# Patient Record
Sex: MALE | Race: WHITE | Employment: PART TIME | URBAN - METROPOLITAN AREA
[De-identification: names, ages, dates, MRNs, and addresses within clinical notes are randomized per-mention and may not be internally consistent; named-entity substitution may affect disease eponyms.]

---

## 2018-01-12 ENCOUNTER — HOSPITAL ENCOUNTER (EMERGENCY)
Age: 18
Discharge: LEFT BEFORE DISCHARGE | End: 2018-01-12
Attending: EMERGENCY MEDICINE
Payer: COMMERCIAL

## 2018-01-12 ENCOUNTER — APPOINTMENT (OUTPATIENT)
Dept: GENERAL RADIOLOGY | Age: 18
End: 2018-01-12
Attending: EMERGENCY MEDICINE
Payer: COMMERCIAL

## 2018-01-12 VITALS
OXYGEN SATURATION: 99 % | HEART RATE: 89 BPM | SYSTOLIC BLOOD PRESSURE: 135 MMHG | WEIGHT: 125 LBS | HEIGHT: 70 IN | BODY MASS INDEX: 17.9 KG/M2 | RESPIRATION RATE: 16 BRPM | DIASTOLIC BLOOD PRESSURE: 81 MMHG | TEMPERATURE: 98.5 F

## 2018-01-12 PROCEDURE — 73130 X-RAY EXAM OF HAND: CPT

## 2018-01-12 PROCEDURE — 99281 EMR DPT VST MAYX REQ PHY/QHP: CPT

## 2018-01-12 PROCEDURE — 73660 X-RAY EXAM OF TOE(S): CPT
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
## 2018-01-13 NOTE — ED NOTES
Patient not found in room. Patient not in x-ray. Unable to locate patient  In the waiting room either.

## 2020-02-06 ENCOUNTER — HOSPITAL ENCOUNTER (EMERGENCY)
Age: 20
Discharge: HOME | End: 2020-02-06
Attending: EMERGENCY MEDICINE | Admitting: EMERGENCY MEDICINE
Payer: COMMERCIAL

## 2020-02-06 VITALS
HEIGHT: 70 IN | OXYGEN SATURATION: 99 % | DIASTOLIC BLOOD PRESSURE: 78 MMHG | BODY MASS INDEX: 20.33 KG/M2 | RESPIRATION RATE: 16 BRPM | SYSTOLIC BLOOD PRESSURE: 140 MMHG | HEART RATE: 89 BPM | TEMPERATURE: 98.8 F | WEIGHT: 142 LBS

## 2020-02-06 DIAGNOSIS — N34.2 INFECTIVE URETHRITIS: ICD-10-CM

## 2020-02-06 DIAGNOSIS — Z20.2 STD EXPOSURE: Primary | ICD-10-CM

## 2020-02-06 DIAGNOSIS — R80.9 PROTEINURIA, UNSPECIFIED TYPE: ICD-10-CM

## 2020-02-06 LAB
ANION GAP SERPL CALC-SCNC: 6 MMOL/L (ref 3–18)
APPEARANCE UR: ABNORMAL
BACTERIA URNS QL MICRO: ABNORMAL /HPF
BASOPHILS # BLD: 0 K/UL (ref 0–0.1)
BASOPHILS NFR BLD: 0 % (ref 0–2)
BILIRUB UR QL: NEGATIVE
BUN SERPL-MCNC: 13 MG/DL (ref 7–18)
BUN/CREAT SERPL: 15 (ref 12–20)
CALCIUM SERPL-MCNC: 9.3 MG/DL (ref 8.5–10.1)
CHLORIDE SERPL-SCNC: 105 MMOL/L (ref 100–111)
CK SERPL-CCNC: 178 U/L (ref 39–308)
CO2 SERPL-SCNC: 26 MMOL/L (ref 21–32)
COLOR UR: ABNORMAL
CREAT SERPL-MCNC: 0.84 MG/DL (ref 0.6–1.3)
DIFFERENTIAL METHOD BLD: ABNORMAL
EOSINOPHIL # BLD: 0.1 K/UL (ref 0–0.4)
EOSINOPHIL NFR BLD: 0 % (ref 0–5)
EPITH CASTS URNS QL MICRO: ABNORMAL /LPF (ref 0–5)
ERYTHROCYTE [DISTWIDTH] IN BLOOD BY AUTOMATED COUNT: 13.2 % (ref 11.6–14.5)
GLUCOSE SERPL-MCNC: 110 MG/DL (ref 74–99)
GLUCOSE UR STRIP.AUTO-MCNC: NEGATIVE MG/DL
HCT VFR BLD AUTO: 41.1 % (ref 36–48)
HGB BLD-MCNC: 13.5 G/DL (ref 13–16)
HGB UR QL STRIP: ABNORMAL
KETONES UR QL STRIP.AUTO: NEGATIVE MG/DL
LEUKOCYTE ESTERASE UR QL STRIP.AUTO: ABNORMAL
LYMPHOCYTES # BLD: 2.2 K/UL (ref 0.9–3.6)
LYMPHOCYTES NFR BLD: 14 % (ref 21–52)
MCH RBC QN AUTO: 29.7 PG (ref 24–34)
MCHC RBC AUTO-ENTMCNC: 32.8 G/DL (ref 31–37)
MCV RBC AUTO: 90.5 FL (ref 74–97)
MONOCYTES # BLD: 0.4 K/UL (ref 0.05–1.2)
MONOCYTES NFR BLD: 3 % (ref 3–10)
NEUTS SEG # BLD: 13.5 K/UL (ref 1.8–8)
NEUTS SEG NFR BLD: 83 % (ref 40–73)
NITRITE UR QL STRIP.AUTO: NEGATIVE
PH UR STRIP: 6 [PH] (ref 5–8)
PLATELET # BLD AUTO: 236 K/UL (ref 135–420)
PMV BLD AUTO: 11.2 FL (ref 9.2–11.8)
POTASSIUM SERPL-SCNC: 3.7 MMOL/L (ref 3.5–5.5)
PROT UR STRIP-MCNC: >300 MG/DL
RBC # BLD AUTO: 4.54 M/UL (ref 4.7–5.5)
RBC #/AREA URNS HPF: ABNORMAL /HPF (ref 0–5)
SODIUM SERPL-SCNC: 137 MMOL/L (ref 136–145)
SP GR UR REFRACTOMETRY: >1.03 (ref 1–1.03)
UROBILINOGEN UR QL STRIP.AUTO: 0.2 EU/DL (ref 0.2–1)
WBC # BLD AUTO: 16.3 K/UL (ref 4.6–13.2)
WBC URNS QL MICRO: >100 /HPF (ref 0–4)

## 2020-02-06 PROCEDURE — 82550 ASSAY OF CK (CPK): CPT

## 2020-02-06 PROCEDURE — 80048 BASIC METABOLIC PNL TOTAL CA: CPT

## 2020-02-06 PROCEDURE — 87086 URINE CULTURE/COLONY COUNT: CPT

## 2020-02-06 PROCEDURE — 99283 EMERGENCY DEPT VISIT LOW MDM: CPT

## 2020-02-06 PROCEDURE — 87491 CHLMYD TRACH DNA AMP PROBE: CPT

## 2020-02-06 PROCEDURE — 85025 COMPLETE CBC W/AUTO DIFF WBC: CPT

## 2020-02-06 PROCEDURE — 74011250637 HC RX REV CODE- 250/637: Performed by: PHYSICIAN ASSISTANT

## 2020-02-06 PROCEDURE — 81001 URINALYSIS AUTO W/SCOPE: CPT

## 2020-02-06 RX ORDER — CEPHALEXIN 500 MG/1
1000 CAPSULE ORAL 2 TIMES DAILY
Qty: 28 CAP | Refills: 0 | Status: SHIPPED | OUTPATIENT
Start: 2020-02-06 | End: 2020-02-13

## 2020-02-06 RX ORDER — CEPHALEXIN 250 MG/1
1000 CAPSULE ORAL
Status: COMPLETED | OUTPATIENT
Start: 2020-02-06 | End: 2020-02-06

## 2020-02-06 RX ADMIN — CEPHALEXIN 1000 MG: 250 CAPSULE ORAL at 22:43

## 2020-02-07 LAB
C TRACH RRNA SPEC QL NAA+PROBE: NEGATIVE
N GONORRHOEA RRNA SPEC QL NAA+PROBE: NEGATIVE
SPECIMEN SOURCE: NORMAL

## 2020-02-07 NOTE — ED NOTES
Allergies verified, pt medicated per MAR, tolerated medication well, no adverse reactions noted, will continue to monitor.

## 2020-02-07 NOTE — ED PROVIDER NOTES
EMERGENCY DEPARTMENT HISTORY AND PHYSICAL EXAM    Date: 2/6/2020  Patient Name: Scooter Mallory. History of Presenting Illness     Chief Complaint   Patient presents with    Blood in Urine         History Provided By: Patient    Chief Complaint: hematuria  Duration: 1 Days  Timing:  Acute  Location:   Quality: Burning  Severity: Moderate  Modifying Factors: none  Associated Symptoms: penile discharge x several days      HPI: Scooter Mota is a 22 y.o. male with a PMH of No significant past medical history who presents to the ER c/o hematuria and penile discharge. Pt states his discharge began a few days ago and after having recent unprotected sexual intercourse. He was seen and treated at urgent care this afternoon and given IM Rocephin and Azithromycin for possible G/C. Pt states this afternoon, he developed gross hematuria. He denied any fevers, chills, sob, chest pain, abd pain, N/V, flank pain, back pain, pain or swelling in his penis/testicles and has no other symptoms or complaints. PCP: None        Past History     Past Medical History:  No past medical history on file. Past Surgical History:  No past surgical history on file. Family History:  No family history on file. Social History:  Social History     Tobacco Use    Smoking status: Current Every Day Smoker     Packs/day: 0.50    Smokeless tobacco: Never Used   Substance Use Topics    Alcohol use: Yes    Drug use: Not Currently       Allergies: Allergies   Allergen Reactions    Pcn [Penicillins] Unknown (comments)         Review of Systems   Review of Systems   Constitutional: Negative for chills, fatigue and fever. HENT: Negative. Negative for sore throat. Eyes: Negative. Respiratory: Negative for cough and shortness of breath. Cardiovascular: Negative for chest pain and palpitations. Gastrointestinal: Negative for abdominal pain, nausea and vomiting. Genitourinary: Positive for discharge and hematuria.  Negative for dysuria and flank pain. Musculoskeletal: Negative. Skin: Negative. Neurological: Negative for dizziness, weakness, light-headedness and headaches. Psychiatric/Behavioral: Negative. All other systems reviewed and are negative. Physical Exam     Vitals:    02/06/20 2044   BP: 163/90   Pulse: 100   Resp: 16   Temp: 99.9 °F (37.7 °C)   SpO2: 100%   Weight: 64.4 kg (142 lb)   Height: 5' 10\" (1.778 m)     Physical Exam  Vitals signs and nursing note reviewed. Constitutional:       General: He is not in acute distress. Appearance: Normal appearance. He is well-developed. He is not ill-appearing. HENT:      Head: Normocephalic and atraumatic. Mouth/Throat:      Mouth: Mucous membranes are moist.   Eyes:      General: No scleral icterus. Conjunctiva/sclera: Conjunctivae normal.   Neck:      Musculoskeletal: Normal range of motion and neck supple. Vascular: No JVD. Trachea: No tracheal deviation. Cardiovascular:      Rate and Rhythm: Normal rate and regular rhythm. Heart sounds: Normal heart sounds. No murmur. Pulmonary:      Effort: Pulmonary effort is normal. No respiratory distress. Breath sounds: Normal breath sounds. No stridor. No wheezing or rhonchi. Chest:      Chest wall: No tenderness. Abdominal:      General: Bowel sounds are normal. There is no distension. Palpations: Abdomen is soft. Tenderness: There is no abdominal tenderness. There is no right CVA tenderness, left CVA tenderness, guarding or rebound. Musculoskeletal:         General: No swelling or tenderness. Skin:     General: Skin is warm and dry. Capillary Refill: Capillary refill takes less than 2 seconds. Neurological:      Mental Status: He is alert and oriented to person, place, and time. GCS: GCS eye subscore is 4. GCS verbal subscore is 5. GCS motor subscore is 6.       Gait: Gait normal.           Diagnostic Study Results     Labs -     Recent Results (from the past 12 hour(s))   URINALYSIS W/ RFLX MICROSCOPIC    Collection Time: 02/06/20  8:48 PM   Result Value Ref Range    Color RED      Appearance BLOODY      Specific gravity >1.030 (H) 1.003 - 1.030    pH (UA) 6.0 5.0 - 8.0      Protein >300 (A) NEG mg/dL    Glucose NEGATIVE  NEG mg/dL    Ketone NEGATIVE  NEG mg/dL    Bilirubin NEGATIVE  NEG      Blood LARGE (A) NEG      Urobilinogen 0.2 0.2 - 1.0 EU/dL    Nitrites NEGATIVE  NEG      Leukocyte Esterase MODERATE (A) NEG     URINE MICROSCOPIC ONLY    Collection Time: 02/06/20  8:48 PM   Result Value Ref Range    WBC >100 (H) 0 - 4 /hpf    RBC TOO NUMEROUS TO COUNT 0 - 5 /hpf    Epithelial cells FEW 0 - 5 /lpf    Bacteria FEW (A) NEG /hpf   CBC WITH AUTOMATED DIFF    Collection Time: 02/06/20  9:35 PM   Result Value Ref Range    WBC 16.3 (H) 4.6 - 13.2 K/uL    RBC 4.54 (L) 4.70 - 5.50 M/uL    HGB 13.5 13.0 - 16.0 g/dL    HCT 41.1 36.0 - 48.0 %    MCV 90.5 74.0 - 97.0 FL    MCH 29.7 24.0 - 34.0 PG    MCHC 32.8 31.0 - 37.0 g/dL    RDW 13.2 11.6 - 14.5 %    PLATELET 956 736 - 633 K/uL    MPV 11.2 9.2 - 11.8 FL    NEUTROPHILS 83 (H) 40 - 73 %    LYMPHOCYTES 14 (L) 21 - 52 %    MONOCYTES 3 3 - 10 %    EOSINOPHILS 0 0 - 5 %    BASOPHILS 0 0 - 2 %    ABS. NEUTROPHILS 13.5 (H) 1.8 - 8.0 K/UL    ABS. LYMPHOCYTES 2.2 0.9 - 3.6 K/UL    ABS. MONOCYTES 0.4 0.05 - 1.2 K/UL    ABS. EOSINOPHILS 0.1 0.0 - 0.4 K/UL    ABS.  BASOPHILS 0.0 0.0 - 0.1 K/UL    DF AUTOMATED     METABOLIC PANEL, BASIC    Collection Time: 02/06/20  9:35 PM   Result Value Ref Range    Sodium 137 136 - 145 mmol/L    Potassium 3.7 3.5 - 5.5 mmol/L    Chloride 105 100 - 111 mmol/L    CO2 26 21 - 32 mmol/L    Anion gap 6 3.0 - 18 mmol/L    Glucose 110 (H) 74 - 99 mg/dL    BUN 13 7.0 - 18 MG/DL    Creatinine 0.84 0.6 - 1.3 MG/DL    BUN/Creatinine ratio 15 12 - 20      GFR est AA >60 >60 ml/min/1.73m2    GFR est non-AA >60 >60 ml/min/1.73m2    Calcium 9.3 8.5 - 10.1 MG/DL   CK    Collection Time: 02/06/20  9:35 PM Result Value Ref Range     39 - 308 U/L       Radiologic Studies -   No orders to display     CT Results  (Last 48 hours)    None        CXR Results  (Last 48 hours)    None            Medical Decision Making   I am the first provider for this patient. I reviewed the vital signs, available nursing notes, past medical history, past surgical history, family history and social history. Vital Signs-Reviewed the patient's vital signs. Records Reviewed: Nursing Notes and Old Medical Records     9:45 PM  23 y/o male who presents to the ER c/o hematuria and penile discharge. Pt was seen at urgent earlier today and tx for STD exposure. Was given IM Rocephin and Azithromycin 1000 mg for G/C treatment. Pt came to ER w/ he began urinating gross blood. No pain to abd/back on exam.  No other symptoms or complaints. UA w/ >100 wbc and rbc. Will plan on labs; urine culture added on. Pt otherwise non-toxic in appearance. Griffin Crenshaw PA-C     10:28 PM  Labs reviewed and noted to be stable other than having a mildly elevated white count. We will send off urine culture and treat with Keflex for UTI. Patient already empirically treated for possible gonorrhea and chlamydia. Discussed strict return precautions. Due to patient's elevated proteinuria we will also give nephrology follow-up. Patient stable for discharge. All questions answered and patient in agreement with plan of care. Will plan for discharge. Griffin Crenshaw PA-C    Disposition:  discharged    DISCHARGE NOTE:       Care plan outlined and precautions discussed. Patient has no new complaints, changes, or physical findings. Results of labs were reviewed with the patient. All medications were reviewed with the patient; will d/c home with keflex. All of pt's questions and concerns were addressed. Patient was instructed and agrees to follow up with nephrology, as well as to return to the ED upon further deterioration.  Patient is ready to go home.    Follow-up Information     Follow up With Specialties Details Why [facility] EMERGENCY DEPT Emergency Medicine  If symptoms worsen [address]  [phone]    [facility] [address] Schedule an appointment as soon as possible for a visit in 1 week ER follow up w/ nephrology for protein in urine as we discussed [address], [place]. [address]  [phone]          Current Discharge Medication List      START taking these medications    Details   cephALEXin (KEFLEX) 500 mg capsule Take 2 Caps by mouth two (2) times a day for 7 days. Qty: 28 Cap, Refills: 0             Provider Notes (Medical Decision Making):     Procedures:  Procedures        Diagnosis     Clinical Impression:   1. STD exposure    2. Infective urethritis    3.  Proteinuria, unspecified type

## 2020-02-07 NOTE — ED TRIAGE NOTES
Patient states he was seen at urgent care earlier today and treated for STI's due to penile discharge. Patient states a few hours ago he \"started peeing nothing but blood\". Patient states burning with urination but no other pain or complaints.

## 2020-02-07 NOTE — DISCHARGE INSTRUCTIONS
Patient Education        Proteinuria: Care Instructions  Your Care Instructions    Proteinuria means that you have too much protein in your urine. This is usually caused by a kidney problem. Your body's blood passes through your kidneys. Normally, the kidneys remove waste from the blood. The waste then leaves the body in the urine. But they don't let protein leave the body. If your kidneys are not working well, they let too much protein get in your urine. A high level of protein in your urine is a sign that something is harming your kidneys. It may be puzzling to find out that you have a problem with your kidneys, because you probably don't feel different. Your doctor may do more tests to find out what is causing the protein to get into your urine. Possible causes include an infection or a medical condition, such as diabetes or heart disease. You may need regular urine tests in the future. You may be able to reduce the protein in your urine by getting exercise, eating a healthy diet, and taking medicine. Follow-up care is a key part of your treatment and safety. Be sure to make and go to all appointments, and call your doctor if you are having problems. It's also a good idea to know your test results and keep a list of the medicines you take. How can you care for yourself at home? · Take your medicines exactly as prescribed. Call your doctor if you think you are having a problem with your medicine. You will get more details on the specific medicines your doctor prescribes. · Work with your doctor and dietitian to set up a diet that will be healthy for you. You may need to:  ? Eat a heart-healthy diet to keep the fat (cholesterol) in your blood under control. ? Eat a low-salt diet to keep your blood pressure at normal levels. ? Limit protein in your foods. ? Limit the amount of fluids you drink. · If you have diabetes, try to keep your blood sugar at normal or near-normal levels. ? Follow your diet.  Eat a variety of foods. Spread carbohydrate throughout your meals. ? If your doctor recommends it, get more exercise. Walking is a good choice. Bit by bit, increase the amount you walk every day. Try for at least 30 minutes on most days of the week. ? Check your blood sugar as often as your doctor recommends. · Do not smoke. Smoking raises your risk of many health problems, including kidney damage. If you need help quitting, talk to your doctor about stop-smoking programs and medicines. These can increase your chances of quitting for good. · Do not take ibuprofen, naproxen, acetaminophen (Tylenol), or similar medicines, unless your doctor tells you to. These medicines may make kidney problems worse. · Check with your doctor before you take any herbal supplements or over-the-counter medicines. When should you call for help? Watch closely for changes in your health, and be sure to contact your doctor if:    · You do not get better as expected. Where can you learn more? Go to http://ange-courtney.info/. Enter B464 in the search box to learn more about \"Proteinuria: Care Instructions. \"  Current as of: October 31, 2018  Content Version: 12.2  © 6626-1576 [employer]. Care instructions adapted under license by [employer] (which disclaims liability or warranty for this information). If you have questions about a medical condition or this instruction, always ask your healthcare professional. [address] any warranty or liability for your use of this information.

## 2020-02-08 LAB
BACTERIA SPEC CULT: NORMAL
SERVICE CMNT-IMP: NORMAL

## 2020-02-22 ENCOUNTER — HOSPITAL ENCOUNTER (EMERGENCY)
Age: 20
Discharge: HOME | End: 2020-02-23
Attending: EMERGENCY MEDICINE
Payer: COMMERCIAL

## 2020-02-22 VITALS
HEART RATE: 85 BPM | RESPIRATION RATE: 20 BRPM | DIASTOLIC BLOOD PRESSURE: 59 MMHG | HEIGHT: 70 IN | WEIGHT: 145 LBS | SYSTOLIC BLOOD PRESSURE: 128 MMHG | OXYGEN SATURATION: 98 % | TEMPERATURE: 97.8 F | BODY MASS INDEX: 20.76 KG/M2

## 2020-02-22 DIAGNOSIS — R45.851 SUICIDAL IDEATIONS: ICD-10-CM

## 2020-02-22 DIAGNOSIS — F11.20 HEROIN ADDICTION (HCC): ICD-10-CM

## 2020-02-22 DIAGNOSIS — F10.920 ALCOHOLIC INTOXICATION WITHOUT COMPLICATION (HCC): Primary | ICD-10-CM

## 2020-02-22 LAB
ALBUMIN SERPL-MCNC: 4.7 G/DL (ref 3.4–5)
ALBUMIN/GLOB SERPL: 1.2 {RATIO} (ref 0.8–1.7)
ALP SERPL-CCNC: 95 U/L (ref 45–117)
ALT SERPL-CCNC: 29 U/L (ref 16–61)
AMPHET UR QL SCN: NEGATIVE
ANION GAP SERPL CALC-SCNC: 8 MMOL/L (ref 3–18)
AST SERPL-CCNC: 19 U/L (ref 10–38)
BARBITURATES UR QL SCN: NEGATIVE
BASOPHILS # BLD: 0 K/UL (ref 0–0.1)
BASOPHILS NFR BLD: 0 % (ref 0–2)
BENZODIAZ UR QL: NEGATIVE
BILIRUB SERPL-MCNC: 0.4 MG/DL (ref 0.2–1)
BUN SERPL-MCNC: 6 MG/DL (ref 7–18)
BUN/CREAT SERPL: 8 (ref 12–20)
CALCIUM SERPL-MCNC: 8.6 MG/DL (ref 8.5–10.1)
CANNABINOIDS UR QL SCN: NEGATIVE
CHLORIDE SERPL-SCNC: 108 MMOL/L (ref 100–111)
CO2 SERPL-SCNC: 25 MMOL/L (ref 21–32)
COCAINE UR QL SCN: POSITIVE
CREAT SERPL-MCNC: 0.77 MG/DL (ref 0.6–1.3)
DIFFERENTIAL METHOD BLD: NORMAL
EOSINOPHIL # BLD: 0 K/UL (ref 0–0.4)
EOSINOPHIL NFR BLD: 0 % (ref 0–5)
ERYTHROCYTE [DISTWIDTH] IN BLOOD BY AUTOMATED COUNT: 13.2 % (ref 11.6–14.5)
ETHANOL SERPL-MCNC: 281 MG/DL (ref 0–3)
GLOBULIN SER CALC-MCNC: 3.8 G/DL (ref 2–4)
GLUCOSE SERPL-MCNC: 122 MG/DL (ref 74–99)
HCT VFR BLD AUTO: 44.3 % (ref 36–48)
HDSCOM,HDSCOM: ABNORMAL
HGB BLD-MCNC: 15.1 G/DL (ref 13–16)
LYMPHOCYTES # BLD: 2.1 K/UL (ref 0.9–3.6)
LYMPHOCYTES NFR BLD: 23 % (ref 21–52)
MCH RBC QN AUTO: 30 PG (ref 24–34)
MCHC RBC AUTO-ENTMCNC: 34.1 G/DL (ref 31–37)
MCV RBC AUTO: 87.9 FL (ref 74–97)
METHADONE UR QL: NEGATIVE
MONOCYTES # BLD: 0.5 K/UL (ref 0.05–1.2)
MONOCYTES NFR BLD: 5 % (ref 3–10)
NEUTS SEG # BLD: 6.5 K/UL (ref 1.8–8)
NEUTS SEG NFR BLD: 72 % (ref 40–73)
OPIATES UR QL: NEGATIVE
PCP UR QL: NEGATIVE
PLATELET # BLD AUTO: 334 K/UL (ref 135–420)
PMV BLD AUTO: 10.3 FL (ref 9.2–11.8)
POTASSIUM SERPL-SCNC: 3.6 MMOL/L (ref 3.5–5.5)
PROT SERPL-MCNC: 8.5 G/DL (ref 6.4–8.2)
RBC # BLD AUTO: 5.04 M/UL (ref 4.7–5.5)
SODIUM SERPL-SCNC: 141 MMOL/L (ref 136–145)
WBC # BLD AUTO: 9.1 K/UL (ref 4.6–13.2)

## 2020-02-22 PROCEDURE — 80307 DRUG TEST PRSMV CHEM ANLYZR: CPT

## 2020-02-22 PROCEDURE — 74011250637 HC RX REV CODE- 250/637: Performed by: EMERGENCY MEDICINE

## 2020-02-22 PROCEDURE — 99285 EMERGENCY DEPT VISIT HI MDM: CPT

## 2020-02-22 PROCEDURE — 85025 COMPLETE CBC W/AUTO DIFF WBC: CPT

## 2020-02-22 PROCEDURE — 80053 COMPREHEN METABOLIC PANEL: CPT

## 2020-02-22 RX ORDER — SODIUM CHLORIDE 0.9 % (FLUSH) 0.9 %
5-40 SYRINGE (ML) INJECTION AS NEEDED
Status: DISCONTINUED | OUTPATIENT
Start: 2020-02-22 | End: 2020-02-23 | Stop reason: HOSPADM

## 2020-02-22 RX ORDER — LORAZEPAM 2 MG/ML
1 INJECTION INTRAMUSCULAR
Status: DISCONTINUED | OUTPATIENT
Start: 2020-02-22 | End: 2020-02-23 | Stop reason: HOSPADM

## 2020-02-22 RX ORDER — LORAZEPAM 2 MG/ML
3 INJECTION INTRAMUSCULAR
Status: DISCONTINUED | OUTPATIENT
Start: 2020-02-22 | End: 2020-02-23 | Stop reason: HOSPADM

## 2020-02-22 RX ORDER — LORAZEPAM 1 MG/1
1 TABLET ORAL
Status: DISCONTINUED | OUTPATIENT
Start: 2020-02-22 | End: 2020-02-23 | Stop reason: HOSPADM

## 2020-02-22 RX ORDER — SODIUM CHLORIDE 0.9 % (FLUSH) 0.9 %
5-40 SYRINGE (ML) INJECTION EVERY 8 HOURS
Status: DISCONTINUED | OUTPATIENT
Start: 2020-02-22 | End: 2020-02-23 | Stop reason: HOSPADM

## 2020-02-22 RX ORDER — LORAZEPAM 1 MG/1
2 TABLET ORAL
Status: DISCONTINUED | OUTPATIENT
Start: 2020-02-22 | End: 2020-02-23 | Stop reason: HOSPADM

## 2020-02-22 RX ORDER — CLONIDINE 0.2 MG/24H
1 PATCH, EXTENDED RELEASE TRANSDERMAL
Status: DISCONTINUED | OUTPATIENT
Start: 2020-02-22 | End: 2020-02-23 | Stop reason: HOSPADM

## 2020-02-22 RX ORDER — LORAZEPAM 2 MG/ML
2 INJECTION INTRAMUSCULAR
Status: DISCONTINUED | OUTPATIENT
Start: 2020-02-22 | End: 2020-02-23 | Stop reason: HOSPADM

## 2020-02-22 NOTE — CONSULTS
This evaluation was conducted via telepsychiatry with the assistance of onsite staff. Pt confirms name, , and consents to telepsychiatry. Chief Complaint: using drugs and suicidal  Requested by: Dr. Rhonda Olea  History of Present Illness: Diane Donahue is a 23 yo  man, single without reported psych hx who presents due to the above. No plan to harm self. , and UDS positive for cocaine. No report of behavioral disturbance in the ER. In my interview, pt is calm and cooperative. He says he called emergency number from home and has been brought in by ambulance for a drug problem and wants help. He says he is depressed about using alcohol/drugs and suicidal without a plan, intent, or hx. He says he has been snorting cocaine and does this about once every wk. He says he has injected 1 bag of heroin, has not injected in a good 3 months.   He says he drinks daily, about a 12 pack of beer. He says he smokes a ppd of nicotine and denies THC. He is asked about why he uses drugs and says they are to help with court issues like probation and charges including breaking and entering and DUI. He says he has gotten out of correction in October. He says he has drug classes and has no hx of formal tx. He denies SI, HI, or voices. He says he has seen a psychiatrist a long time ago to get into drug classes and has not gone in 2wks.   Pt denies symptoms of acute withdrawal.  Collateral: none available  SI/ Self harm: passive SI due to drug use  HI/Violence: none reported  Trauma history: denies; no  reported  Access to weapons: no firearms reported  Legal: as above; longest incarceration has been 30 days  Psychiatric History/Treatment History: as above; no hx of inpt; no prior psych consults appreciated in cursory review of EPIC  Drug/Alcohol History: see above; no hx of inpt tx; no hx of seizures, DTs  Medical History: none reported   Medications & Freq: none reported   Allergies: none reported by pt; PCN below  Family Psych History/History of suicide: not aware of any  Social History: single; no children; living with parents and 20 yo cousin   Employment: lost job for [employer] about 4 wks ago/fired   Education: 11th grade   Stressors: legal/drugs   Strengths/supports: supportive family   Mu-ism/Spiritual: none reported  Mental Status Exam:   Appearance and attire: white man, fit build, who appears stated age; blue scrubs; short mustache; short hair, orange-colored; Attitude and behavior: calm, cooperative; no visible tremor  Speech: monotone; coherent  Affect and mood: down mood with constricted affect  Association and thought processes: linear  Thought content: passive SI; no intent or plan; preoccupied by legal  Perception: denying; not responding  Sensorium, memory, and orientation: alert and oriented x 3  Intellectual functioning: average  Insight and judgment: fair  Impression/Risk Assessment: Pt is a 21 yo  man without reported psych hx, polysubstance who is brought in by emergency services due to being suicidal without intent, plan given drug problem. No acute withdrawal at this time. In my interview, pt is calm, contracts for safety. He has been in good behavioral control. He says he wants help and is willing to sign himself into a detox/rehab. I think this is reasonable and appropriate.    Diagnosis:   Unspecified Depression  Polysubstance Use Dx (DSM V adaptation)  Cocaine, Heroin, Alcohol, Nicotine  r/o Substance-induced Depression  Treatment Recommendations: detox/rehab via [facility]   Psychiatric Clearance: cleared for the above  Observation level: close observation/neuro checks with acute withdrawal (less likely given age and negative hx)  Pharmacological:   Clonidine 0.1mg q2hr COWS > 12  Ativan 1mg q2h4 CIWA > 8 plus vitamins  Nicotine 14-21mg transdermal patch as desired  Hydroxyzine 50mg q4hr prn anxiety  Supportive meds for withdrawal  Therapy: supportive; substance  Level of Care: detox/rehab as above; case discussed with Dr. Fletcher Letters; reconsult as necessary  Medical History:   No past medical history on file. Medications & Freq:   Prior to Admission medications    Not on File     Allergies:    Allergies   Allergen Reactions    Pcn [Penicillins] Unknown (comments)

## 2020-02-22 NOTE — PROGRESS NOTES
Lacy from [facility] here to evaluate for Crisis Stabilization. 1- Per Lacy from [facility], she is recommending Crisis Stabilization bed but will not be until tomorrow.     Rose Marie Hein RN    Outcomes Manager  [phone]  [phone]

## 2020-02-22 NOTE — ED TRIAGE NOTES
Patient states \"I need rehab for drugs and I am feeling suicidal but just because of the drugs. \"  Patient very anxious about  finding out that he has been on drugs. Patient states last time shooting up heroine and cocaine was about 4 hours ago.

## 2020-02-22 NOTE — ED NOTES
Patient signed out needing psychiatric disposition  I be made aware by my charge nurse that patient is currently not suicidal not homicidal does not want to be getting any more psychiatric help wants to leave. I placed a new psychiatric consult for this patient to reevaluate for patient's disposition. 6:53 PM : Pt care transferred to Dr. Patel Bell ,ED provider. History of patient complaint(s), available diagnostic reports and current treatment plan has been discussed thoroughly.      Intended disposition of patient : TBD  Pending diagnostics reports and/or labs (please list): Awaiting psychiatric disposition

## 2020-02-22 NOTE — ED PROVIDER NOTES
EMERGENCY DEPARTMENT HISTORY AND PHYSICAL EXAM    8:04 AM      Date: 2/22/2020  Patient Name: Deloris Morales. History of Presenting Illness     Chief Complaint   Patient presents with    Drug Problem    Suicidal         History Provided By: Patient      Additional History (Context): Deloris Sheppard is a 22 y.o. male who presents with dual complaint of IV heroin abuse multiple substance abuse as well as suicidal ideations. Patient states he has been thinking about hurting himself for the past 2 days that he is coming in requesting help for his addiction as well as his suicidal ideations. He has not had any ingestions and attempt to hurt himself he does not have any plan at present. He is cooperative for admission he does not know his status in terms of HIV or hepatitis. Social history is remarkable for marijuana, heroin and cocaine. PCP: None          Past History     Past Medical History:  No past medical history on file. Past Surgical History:  No past surgical history on file. Family History:  No family history on file. Social History:  Social History     Tobacco Use    Smoking status: Current Every Day Smoker     Packs/day: 2.00    Smokeless tobacco: Never Used   Substance Use Topics    Alcohol use: Yes     Comment: a bottle of vodka a day    Drug use: Yes     Types: Cocaine, Heroin       Allergies: Allergies   Allergen Reactions    Pcn [Penicillins] Unknown (comments)         Review of Systems       Review of Systems   Constitutional: Negative for chills and fever. Respiratory: Negative for shortness of breath. Cardiovascular: Negative for chest pain. Gastrointestinal: Negative for abdominal pain, nausea and vomiting. Skin: Negative for rash. Neurological: Negative for dizziness, syncope and weakness. Psychiatric/Behavioral: Positive for dysphoric mood, self-injury and suicidal ideas. All other systems reviewed and are negative.         Physical Exam     Visit Vitals  BP 112/68 (BP 1 Location: Right arm)   Pulse 91   Temp 98 °F (36.7 °C)   Resp 16   Ht 5' 10\" (1.778 m)   Wt 65.8 kg (145 lb)   SpO2 98%   BMI 20.81 kg/m²       Physical Exam  Vitals signs and nursing note reviewed. Constitutional:       General: He is not in acute distress. Appearance: He is well-developed. Comments: Sleepy awakens to voice then able to stay awake   HENT:      Head: Normocephalic and atraumatic. Eyes:      General: No scleral icterus. Conjunctiva/sclera: Conjunctivae normal.      Pupils: Pupils are equal, round, and reactive to light. Neck:      Musculoskeletal: Normal range of motion and neck supple. Cardiovascular:      Rate and Rhythm: Normal rate and regular rhythm. Heart sounds: Normal heart sounds. No murmur. Pulmonary:      Effort: Pulmonary effort is normal. No respiratory distress. Breath sounds: Normal breath sounds. Abdominal:      General: Bowel sounds are normal. There is no distension. Palpations: Abdomen is soft. Tenderness: There is no abdominal tenderness. Musculoskeletal:      Comments: Superficial abrasions to right hand   Lymphadenopathy:      Cervical: No cervical adenopathy. Skin:     General: Skin is warm and dry. Findings: No rash. Neurological:      Mental Status: He is alert and oriented to person, place, and time. GCS: GCS eye subscore is 4. GCS verbal subscore is 5. GCS motor subscore is 6. Coordination: Coordination normal.   Psychiatric:         Attention and Perception: Attention normal. He does not perceive auditory hallucinations. Mood and Affect: Mood normal.         Speech: Speech normal.         Behavior: Behavior normal. Behavior is cooperative. Thought Content: Thought content is not paranoid. Thought content includes suicidal ideation. Thought content does not include homicidal ideation. Thought content does not include suicidal plan.          Cognition and Memory: Cognition normal. Judgment: Judgment normal.           Diagnostic Study Results     Labs -  Recent Results (from the past 12 hour(s))   DRUG SCREEN, URINE    Collection Time: 02/22/20  7:35 AM   Result Value Ref Range    BENZODIAZEPINES NEGATIVE  NEG      BARBITURATES NEGATIVE  NEG      THC (TH-CANNABINOL) NEGATIVE  NEG      OPIATES NEGATIVE  NEG      PCP(PHENCYCLIDINE) NEGATIVE  NEG      COCAINE POSITIVE (A) NEG      AMPHETAMINES NEGATIVE  NEG      METHADONE NEGATIVE  NEG      HDSCOM (NOTE)    CBC WITH AUTOMATED DIFF    Collection Time: 02/22/20  7:36 AM   Result Value Ref Range    WBC 9.1 4.6 - 13.2 K/uL    RBC 5.04 4.70 - 5.50 M/uL    HGB 15.1 13.0 - 16.0 g/dL    HCT 44.3 36.0 - 48.0 %    MCV 87.9 74.0 - 97.0 FL    MCH 30.0 24.0 - 34.0 PG    MCHC 34.1 31.0 - 37.0 g/dL    RDW 13.2 11.6 - 14.5 %    PLATELET 261 067 - 938 K/uL    MPV 10.3 9.2 - 11.8 FL    NEUTROPHILS 72 40 - 73 %    LYMPHOCYTES 23 21 - 52 %    MONOCYTES 5 3 - 10 %    EOSINOPHILS 0 0 - 5 %    BASOPHILS 0 0 - 2 %    ABS. NEUTROPHILS 6.5 1.8 - 8.0 K/UL    ABS. LYMPHOCYTES 2.1 0.9 - 3.6 K/UL    ABS. MONOCYTES 0.5 0.05 - 1.2 K/UL    ABS. EOSINOPHILS 0.0 0.0 - 0.4 K/UL    ABS. BASOPHILS 0.0 0.0 - 0.1 K/UL    DF AUTOMATED     METABOLIC PANEL, COMPREHENSIVE    Collection Time: 02/22/20  7:36 AM   Result Value Ref Range    Sodium 141 136 - 145 mmol/L    Potassium 3.6 3.5 - 5.5 mmol/L    Chloride 108 100 - 111 mmol/L    CO2 25 21 - 32 mmol/L    Anion gap 8 3.0 - 18 mmol/L    Glucose 122 (H) 74 - 99 mg/dL    BUN 6 (L) 7.0 - 18 MG/DL    Creatinine 0.77 0.6 - 1.3 MG/DL    BUN/Creatinine ratio 8 (L) 12 - 20      GFR est AA >60 >60 ml/min/1.73m2    GFR est non-AA >60 >60 ml/min/1.73m2    Calcium 8.6 8.5 - 10.1 MG/DL    Bilirubin, total 0.4 0.2 - 1.0 MG/DL    ALT (SGPT) 29 16 - 61 U/L    AST (SGOT) 19 10 - 38 U/L    Alk.  phosphatase 95 45 - 117 U/L    Protein, total 8.5 (H) 6.4 - 8.2 g/dL    Albumin 4.7 3.4 - 5.0 g/dL    Globulin 3.8 2.0 - 4.0 g/dL    A-G Ratio 1.2 0.8 - 1.7 ETHYL ALCOHOL    Collection Time: 02/22/20  7:36 AM   Result Value Ref Range    ALCOHOL(ETHYL),SERUM 281 (H) 0 - 3 MG/DL       Radiologic Studies -   No orders to display         Medical Decision Making   I am the first provider for this patient. I reviewed the vital signs, available nursing notes, past medical history, past surgical history, family history and social history. Vital Signs-Reviewed the patient's vital signs. EKG:    Records Reviewed: Nursing Notes and Old Medical Records (Time of Review: 8:04 AM)    ED Course: Progress Notes, Reevaluation, and Consults:      Provider Notes (Medical Decision Making):   MDM  Number of Diagnoses or Management Options  Alcoholic intoxication without complication ([address]):   Heroin addiction ([facility]):   Suicidal ideations:   Diagnosis management comments: Multi-substance abuse no current plan although does have suicidal ideations sleepy states last used heroin approximately 4 AM but easily awakens to voice ambulatory about the emergency department    8:36 AM  Medically cleared for psychiatric evaluation    12:03 PM  Discussed  with tele-psychiatry agrees with voluntary for crisis stabilization    2:53 PM  [facility] in Ed to evaluate for possible crises Stabilization unit bed     Care transferred to Dr Jossue Grissom and Dr Emory Bourgeois        Amount and/or Complexity of Data Reviewed  Clinical lab tests: ordered            Critical Care Time:       Diagnosis     Clinical Impression:   1. Alcoholic intoxication without complication ([address]    2. Heroin addiction ([facility]    3. Suicidal ideations        Disposition:     Follow-up Information    None          Patient's Medications    No medications on file     _______________________________    Please note that this dictation was completed with Anita Margarita, the [employer] voice recognition software. Quite often unanticipated grammatical, syntax, homophones, and other interpretive errors are inadvertently transcribed by the computer software.   Please disregard these errors. Please excuse any errors that have escaped final proofreading.

## 2020-02-22 NOTE — ED NOTES
Assume care of patient, patient awake and restless in room, patient in paper scrubs, sitter at bedside, POC discussed with patient

## 2020-02-22 NOTE — PROGRESS NOTES
Referred to [facility] for Crisis Stabilization. Spoke to Channing at [facility]. Information faxed. Channing states someone will be out to evaluate when able.     Ronda العلي RN    Outcomes Manager  [phone]  [phone]

## 2020-02-22 NOTE — ED NOTES
Pt denies suicidal ideations at this time and requested to be discharged home.   This will be discussed with attending

## 2020-02-23 NOTE — PROGRESS NOTES
2/23/2020 0655--Called [facility]. Spoke to [place]. Informed him that patient was re-evaluated by psych and deemed appropriate for discharge home.     Lily Seaman RN    Outcomes Manager  [phone]  [phone]

## 2020-02-23 NOTE — ED NOTES
Verbal shift change report given to Hilaria Lizarraga (oncoming nurse) by Darren Saldana (offgoing nurse). Report included the following information SBAR, ED Summary and MAR.

## 2020-02-23 NOTE — ED NOTES
1: 07 AM  02/23/20     Discharge instructions given to patient (name) with verbalization of understanding. Patient accompanied by self. Patient discharged with the following prescriptions none. Patient discharged to home (destination).       Hanh Purcell RN

## 2020-02-23 NOTE — CONSULTS
Name: Adeel Bermudez. : 2000  Date: 2020   Time: 2327   Location of patient: [facility] Location of doctor:    [facility]  This evaluation was conducted via telepsychiatry with the assistance of onsite staff    Chief Complaint: wanting rehab for drugs and passive SI  History of Present Illness: Patient is a 24-year-old single,  gentleman who presented to the emergency department around 7 AM this morning stating \"I need rehab for drugs and I am feeling suicidal but just because of the drugs. \" Patient reported the last shot up with heroin and cocaine roughly 4 hours before presentation which would be around 3 AM this morning. Patient was evaluated by tele-psychiatry around noon by Dr. Winnie Brewer. At that time, Dr. Winnie Brewer noted that patient was calm and cooperative, forward thinking, wanting help for drug problems. Patients drug of choice includes cocaine which he snorts roughly once a week, heroin which he recently relapsed after roughly 3 months of sobriety, drinking daily roughly 12 pack of beer, smokes a pack of cigarettes daily. He admits [address] issues he is currently on probation, breaking and entering, DUI he was last released from prison and 2019. He has drug classes currently but no formal treatment. Earlier today he was agreeable and seeking inpatient drug and alcohol rehabilitation, he has been consistently without active suicidal ideation since admission to the emergency department. Later this afternoon patient change his mind and request discharge from the hospital as he would like to continue outpatient drug classes. He shared that he has spoken with his father who agrees with the plan. Tele-psychiatry was consultative. At the time of my follow-up interview, patient was pleasant and cooperative, he states that he is feeling much better now and is ready to go.  I questioned about his passive suicidal ideation which endorse earlier he admits that it is most likely secondary to the substances which he had used earlier this morning. He states that he is planning on returning home as he lives with his parents and to attend his drug class. \"I need to get my life back together. I need to stay away from bad influences and stay away from drugs and alcohol. \" He adamantly denies any need for treatment for depression, anxiety, denies symptoms of hypomania, pastor, or psychosis. Please see Dr. Rosita Paniagua' complete evaluation around 12 PM today for details. Collateral: None were available  Mental Status Exam:   Appearance and attire: Adequately groomed, dressed in hospital gown  Attitude and behavior: Pleasant and cooperative with interview, calm  Speech: Monotone, coherent  Affect and mood: Constricted affect, non-labile, euthymic  Association and thought processes: Logical and goal-directed  Thought content: Adamantly denies suicidal or homicidal ideation, denies paranoid ideations or delusions. Perception: denies auditory or visual hallucinations  Sensorium, memory, and orientation: memory intact, alert and oriented x 4  Intellectual functioning: average  Insight and judgment: Adequate   Impression/Risk Assessment: Patient is a 20-year-old  gentleman was how previous psychiatric history, polysubstance use/dependence with drug of choice being cocaine, heroin, alcohol presented to the emergency department earlier this morning after using heroin and cocaine around 3 AM endorse passive suicidal ideation. Since admission, his suicidal thoughts have consistently decreased. Patient had actively sought inpatient drug and alcohol detox/rehabilitation but then after staying in the hospital for some time changes his mind and feels that he is ready to continue outpatient treatment. Diagnosis: Polysubstance use disorder with cocaine, heroin, alcohol. Rollout substance-induced depressed mood.   Treatment Recommendations: Please provide patient with outpatient drug and alcohol treatment information. As of now patient is declining inpatient drug and alcohol rehabilitation. He has consistently denied active suicidal ideation has been pleasant and cooperative since admission to the emergency department. At this time, patient does not meet criteria for TDO. Case was discussed with his nurse. Psychiatric Clearance:  Patient is ready for discharge from the emergency department when the primary team is ready. Observation level: continue current level of observation  Therapy: Would benefit from substance abuse therapy.    Level of Care: outpatient

## 2020-02-23 NOTE — ED NOTES
ED Course as of Feb 22 2355   Sat Feb 22, 2020 2354 Patient was reevaluated by psychiatry who feels he is safe for discharge. On reevaluation the patient is calm and cooperative, denies any SI or HI, plans to go home with his dad and will follow-up with his psychotherapy appointment next week.     [KG]      ED Course User Index  [KG] Danis Ayala DO